# Patient Record
Sex: FEMALE | Race: WHITE | NOT HISPANIC OR LATINO | Employment: OTHER | URBAN - METROPOLITAN AREA
[De-identification: names, ages, dates, MRNs, and addresses within clinical notes are randomized per-mention and may not be internally consistent; named-entity substitution may affect disease eponyms.]

---

## 2018-06-05 ENCOUNTER — HOSPITAL ENCOUNTER (OUTPATIENT)
Facility: CLINIC | Age: 33
Discharge: HOME | End: 2018-06-05
Attending: FAMILY MEDICINE
Payer: COMMERCIAL

## 2018-06-05 VITALS
BODY MASS INDEX: 26.5 KG/M2 | DIASTOLIC BLOOD PRESSURE: 58 MMHG | HEART RATE: 92 BPM | OXYGEN SATURATION: 98 % | SYSTOLIC BLOOD PRESSURE: 114 MMHG | TEMPERATURE: 98.8 F | HEIGHT: 62 IN | WEIGHT: 144 LBS | RESPIRATION RATE: 18 BRPM

## 2018-06-05 DIAGNOSIS — H66.011 ACUTE SUPPURATIVE OTITIS MEDIA OF RIGHT EAR WITH SPONTANEOUS RUPTURE OF TYMPANIC MEMBRANE, RECURRENCE NOT SPECIFIED: Primary | ICD-10-CM

## 2018-06-05 PROCEDURE — 99214 OFFICE O/P EST MOD 30 MIN: CPT | Performed by: FAMILY MEDICINE

## 2018-06-05 PROCEDURE — S9083 URGENT CARE CENTER GLOBAL: HCPCS | Performed by: FAMILY MEDICINE

## 2018-06-05 RX ORDER — CEFDINIR 300 MG/1
300 CAPSULE ORAL 2 TIMES DAILY
COMMUNITY
End: 2024-01-18

## 2018-06-05 RX ORDER — OXYCODONE AND ACETAMINOPHEN 5; 325 MG/1; MG/1
1 TABLET ORAL EVERY 6 HOURS PRN
Qty: 10 TABLET | Refills: 0 | Status: SHIPPED | OUTPATIENT
Start: 2018-06-05 | End: 2018-06-08

## 2018-06-05 NOTE — DISCHARGE INSTRUCTIONS
Caution with drowsiness with percocet ,take only at home and do not take that when has to go out or drive   Follow up with ENT as soon as possible ,call for an appointment to be seen     Go to ER at [facility] if symptoms gets worse

## 2018-06-06 ASSESSMENT — ENCOUNTER SYMPTOMS
SORE THROAT: 0
GASTROINTESTINAL NEGATIVE: 1
SINUS PAIN: 0
EYES NEGATIVE: 1
SINUS PRESSURE: 0
CARDIOVASCULAR NEGATIVE: 1
FEVER: 0
MUSCULOSKELETAL NEGATIVE: 1
RESPIRATORY NEGATIVE: 1
CHILLS: 0

## 2018-06-06 NOTE — ED PROVIDER NOTES
History  Chief Complaint   Patient presents with   • Earache / Otalgia     She is here with the c/o severe Rt ear pain ,noticed some bleeding from Rt ear today,she was seen by pcp recently and initially given amoxicillin but her symptoms were nor getting better so she called pcp again and they changed that to cefdinir.she also recently used q tips Rt ear     Had URI symptoms recently .              No past medical history on file.    No past surgical history on file.    No family history on file.    Social History   Substance Use Topics   • Smoking status: Not on file   • Smokeless tobacco: Not on file   • Alcohol use Not on file       Review of Systems   Constitutional: Negative for chills and fever.   HENT: Positive for ear discharge and ear pain. Negative for sinus pain, sinus pressure and sore throat.    Eyes: Negative.    Respiratory: Negative.    Cardiovascular: Negative.    Gastrointestinal: Negative.    Genitourinary: Negative.    Musculoskeletal: Negative.        Physical Exam  ED Triage Vitals [06/05/18 1804]   Temp Heart Rate Resp BP SpO2   37.1 °C (98.8 °F) 92 18 (!) 114/58 98 %      Temp src Heart Rate Source Patient Position BP Location FiO2 (%) (Set)   -- -- -- -- --       Physical Exam   HENT:   Right Ear: External ear normal.   Left Ear: Tympanic membrane, external ear and ear canal normal.   Nose: Nose normal.   Mouth/Throat: Oropharynx is clear and moist. No oropharyngeal exudate.   As for as I can see through Erythema of RT TM and bulging ,but I am not able to see clearly even with multiple attempts as has cerumen in ear canal and also has drainage in ear canal ,? RT TM perforation but I am not able to be determine as above    Eyes: Conjunctivae are normal. Pupils are equal, round, and reactive to light.   Neck: Normal range of motion. Neck supple.   Cardiovascular: Normal rate and normal heart sounds.    Pulmonary/Chest: Effort normal and breath sounds normal. No respiratory distress. She has no  rales.   Lymphadenopathy:     She has no cervical adenopathy.         Procedures  Procedures    UC Course  ED Course          Clinical Impressions as of Jun 06 1050   Acute suppurative otitis media of right ear with spontaneous rupture of tympanic membrane, recurrence not specified       MDM  Number of Diagnoses or Management Options  Diagnosis management comments: Otitis media with cerumen in ear canal and drainage ,? RT TM perforation.  So I didn't attempt to do ear irrigation and she needs to see ENT for recheck and evaluaiton ,given ENT contact information to call for an appt tomorrow  Advised to continue cefdinir which she is taking now  As she has severe pain and very uncomfortable even with exam given percocet prn to help symptomatically ,reviwed the  aware and I was not able to find the patient in system and as per patient she used percocet in the past when she had TMJ problem but didn't get any narcotics recently .                 Lucia Franco MD  06/06/18 1052

## 2021-04-14 DIAGNOSIS — Z23 ENCOUNTER FOR IMMUNIZATION: ICD-10-CM

## 2022-08-16 ENCOUNTER — TELEPHONE (OUTPATIENT)
Dept: SCHEDULING | Facility: CLINIC | Age: 37
End: 2022-08-16
Payer: COMMERCIAL

## 2022-08-16 NOTE — TELEPHONE ENCOUNTER
New Patient Appointment Request    Name of caller: Guadalupe Clark    Reason for Visit: severe chest pains , sob, no exertion     Insurance: PERSONAL CHOICE    Insurance ID #: [id number]    Recent Procedures: no    Referred by: Self    Previous Cardiologist name and phone number: n/a    Best contact number: [phone]    Additional notes:  Pt scheduled for npv on 11/30/22 @ 2:30 pm, pt added to wait list

## 2022-11-30 ENCOUNTER — OFFICE VISIT (OUTPATIENT)
Dept: CARDIOLOGY | Facility: CLINIC | Age: 37
End: 2022-11-30
Payer: COMMERCIAL

## 2022-11-30 VITALS
DIASTOLIC BLOOD PRESSURE: 89 MMHG | WEIGHT: 173 LBS | HEART RATE: 89 BPM | HEIGHT: 64 IN | OXYGEN SATURATION: 97 % | SYSTOLIC BLOOD PRESSURE: 130 MMHG | BODY MASS INDEX: 29.53 KG/M2

## 2022-11-30 DIAGNOSIS — E78.2 MIXED HYPERLIPIDEMIA: ICD-10-CM

## 2022-11-30 DIAGNOSIS — Z87.891 FORMER SMOKER: ICD-10-CM

## 2022-11-30 DIAGNOSIS — R07.81 PLEURITIC CHEST PAIN: ICD-10-CM

## 2022-11-30 DIAGNOSIS — R06.02 SOB (SHORTNESS OF BREATH): Primary | ICD-10-CM

## 2022-11-30 PROCEDURE — 99204 OFFICE O/P NEW MOD 45 MIN: CPT | Performed by: INTERNAL MEDICINE

## 2022-11-30 PROCEDURE — 93000 ELECTROCARDIOGRAM COMPLETE: CPT | Performed by: INTERNAL MEDICINE

## 2022-11-30 PROCEDURE — 3008F BODY MASS INDEX DOCD: CPT | Performed by: INTERNAL MEDICINE

## 2022-11-30 RX ORDER — DESOGESTREL/ETHINYL ESTRADIOL AND ETHINYL ESTRADIOL 21-5 (28)
KIT ORAL
COMMUNITY
Start: 2022-11-07

## 2022-11-30 RX ORDER — PROPRANOLOL HYDROCHLORIDE 20 MG/1
TABLET ORAL
COMMUNITY
Start: 2022-11-10 | End: 2024-01-18

## 2022-11-30 RX ORDER — LITHIUM CARBONATE 300 MG/1
900 CAPSULE ORAL NIGHTLY
COMMUNITY
Start: 2022-09-23 | End: 2024-01-18

## 2022-11-30 RX ORDER — FERROUS SULFATE 325(65) MG
325 TABLET ORAL
COMMUNITY

## 2022-11-30 RX ORDER — QUETIAPINE FUMARATE 300 MG/1
600 TABLET, FILM COATED ORAL
COMMUNITY
Start: 2022-10-28 | End: 2024-01-18

## 2022-11-30 RX ORDER — CARBAMAZEPINE 200 MG/1
600 TABLET ORAL DAILY
COMMUNITY
Start: 2022-10-28 | End: 2024-01-18

## 2022-12-09 ENCOUNTER — TELEPHONE (OUTPATIENT)
Dept: SCHEDULING | Facility: CLINIC | Age: 37
End: 2022-12-09
Payer: COMMERCIAL

## 2022-12-09 NOTE — TELEPHONE ENCOUNTER
Pt has appt for TTE at [facility] at 2PM today and was just diagnosed w/ bronchitis by PCP.     Pt is asking if TTE should be r/s?    Pt can be reached at [phone].     Ty.

## 2022-12-19 ENCOUNTER — HOSPITAL ENCOUNTER (OUTPATIENT)
Dept: CARDIOLOGY | Facility: CLINIC | Age: 37
Discharge: HOME | End: 2022-12-19
Attending: INTERNAL MEDICINE
Payer: COMMERCIAL

## 2022-12-19 VITALS
WEIGHT: 173 LBS | HEIGHT: 64 IN | BODY MASS INDEX: 29.53 KG/M2 | SYSTOLIC BLOOD PRESSURE: 128 MMHG | DIASTOLIC BLOOD PRESSURE: 88 MMHG

## 2022-12-19 DIAGNOSIS — R06.02 SOB (SHORTNESS OF BREATH): ICD-10-CM

## 2022-12-19 PROCEDURE — 93306 TTE W/DOPPLER COMPLETE: CPT | Performed by: INTERNAL MEDICINE

## 2022-12-20 ENCOUNTER — TELEPHONE (OUTPATIENT)
Dept: CARDIOLOGY | Facility: CLINIC | Age: 37
End: 2022-12-20
Payer: COMMERCIAL

## 2022-12-20 LAB
AORTIC ROOT ANNULUS: 2.8 CM
ASCENDING AORTA: 2.8 CM
AV PEAK GRADIENT: 5 MMHG
AV PEAK VELOCITY-S: 1.12 M/S
AV VALVE AREA: 2.46 CM2
BSA FOR ECHO PROCEDURE: 1.88 M2
E WAVE DECELERATION TIME: 155 MS
E/A RATIO: 1.7
E/E' RATIO: 10.9
E/LAT E' RATIO: 8
EDV (BP): 80.3 CM3
EF (A4C): 70.2 %
EF A2C: 69.2 %
EJECTION FRACTION: 68 %
ESV (BP): 25.7 CM3
FRACTIONAL SHORTENING: 37.26 %
INTERVENTRICULAR SEPTUM: 0.83 CM
LA ESV (BP): 42 CM3
LA ESV INDEX (A2C): 27.02 CM3/M2
LA ESV INDEX (BP): 22.34 CM3/M2
LA/AORTA RATIO: 1.11
LAAS-AP2: 17.4 CM2
LAAS-AP4: 14.5 CM2
LAD 2D: 3.1 CM
LALD A4C: 4.74 CM
LALD A4C: 5.15 CM
LAV-S: 50.8 CM3
LEFT ATRIUM VOLUME INDEX: 17.07 CM3/M2
LEFT ATRIUM VOLUME: 32.1 CM3
LEFT INTERNAL DIMENSION IN SYSTOLE: 2.61 CM (ref 2.56–3.88)
LEFT VENTRICLE DIASTOLIC VOLUME INDEX: 47.07 CM3/M2
LEFT VENTRICLE DIASTOLIC VOLUME: 88.5 CM3
LEFT VENTRICLE SYSTOLIC VOLUME INDEX: 13.99 CM3/M2
LEFT VENTRICLE SYSTOLIC VOLUME: 26.3 CM3
LEFT VENTRICULAR INTERNAL DIMENSION IN DIASTOLE: 4.16 CM (ref 4.33–6.02)
LEFT VENTRICULAR POSTERIOR WALL IN END DIASTOLE: 0.93 CM (ref 0.57–1.06)
LV DIASTOLIC VOLUME: 69.1 CM3
LV ESV (APICAL 2 CHAMBER): 21.3 CM3
LVAD-AP2: 24.5 CM2
LVAD-AP4: 29.1 CM2
LVAS-AP2: 11.7 CM2
LVAS-AP4: 14.1 CM2
LVEDVI(A2C): 36.76 CM3/M2
LVEDVI(BP): 42.71 CM3/M2
LVESVI(A2C): 11.33 CM3/M2
LVESVI(BP): 13.67 CM3/M2
LVLD-AP2: 7.34 CM
LVLD-AP4: 7.85 CM
LVLS-AP2: 5.54 CM
LVLS-AP4: 6.58 CM
LVOT 2D: 2 CM
LVOT A: 3.14 CM2
LVOT PEAK VELOCITY: 1.12 M/S
LVOT PG: 5 MMHG
MLH CV ECHO AVA INDEX VELOCITY RATIO: 1.3
MV E'TISSUE VEL-LAT: 0.11 M/S
MV E'TISSUE VEL-MED: 0.08 M/S
MV PEAK A VEL: 0.54 M/S
MV PEAK E VEL: 0.9 M/S
POSTERIOR WALL: 0.93 CM
RVOT VMAX: 0.78 M/S
SEPTAL TISSUE DOPPLER FREE WALL LATE DIA VELOCITY (APICAL 4 CHAMBER VIEW): 0.11 M/S
Z-SCORE OF LEFT VENTRICULAR DIMENSION IN END DIASTOLE: -2.05
Z-SCORE OF LEFT VENTRICULAR DIMENSION IN END SYSTOLE: -1.49
Z-SCORE OF LEFT VENTRICULAR POSTERIOR WALL IN END DIASTOLE: 0.95

## 2022-12-20 NOTE — TELEPHONE ENCOUNTER
Telephone call but voicemail box was not set up to accept calls.  The purpose of the call was to review her unremarkable echo.  Will try again.

## 2024-01-04 ENCOUNTER — TRANSCRIBE ORDERS (OUTPATIENT)
Dept: SCHEDULING | Age: 39
End: 2024-01-04

## 2024-01-04 DIAGNOSIS — R20.2 PARESTHESIA OF SKIN: Primary | ICD-10-CM

## 2024-01-22 ENCOUNTER — HOSPITAL ENCOUNTER (OUTPATIENT)
Dept: RADIOLOGY | Facility: HOSPITAL | Age: 39
Discharge: HOME | End: 2024-01-22
Attending: FAMILY MEDICINE
Payer: COMMERCIAL

## 2024-01-22 DIAGNOSIS — R20.2 PARESTHESIA OF SKIN: ICD-10-CM

## 2024-01-22 RX ORDER — GADOBUTROL 604.72 MG/ML
0.1 INJECTION INTRAVENOUS ONCE
Status: COMPLETED | OUTPATIENT
Start: 2024-01-22 | End: 2024-01-22

## 2024-01-22 RX ADMIN — GADOBUTROL 7.3 ML: 604.72 INJECTION INTRAVENOUS at 11:03
